# Patient Record
Sex: MALE
[De-identification: names, ages, dates, MRNs, and addresses within clinical notes are randomized per-mention and may not be internally consistent; named-entity substitution may affect disease eponyms.]

---

## 2019-10-24 PROBLEM — Z00.00 ENCOUNTER FOR PREVENTIVE HEALTH EXAMINATION: Status: ACTIVE | Noted: 2019-10-24

## 2019-10-29 ENCOUNTER — APPOINTMENT (OUTPATIENT)
Dept: OPHTHALMOLOGY | Facility: CLINIC | Age: 49
End: 2019-10-29
Payer: COMMERCIAL

## 2019-10-29 ENCOUNTER — NON-APPOINTMENT (OUTPATIENT)
Age: 49
End: 2019-10-29

## 2019-10-29 PROCEDURE — 92004 COMPRE OPH EXAM NEW PT 1/>: CPT

## 2023-12-21 ENCOUNTER — APPOINTMENT (OUTPATIENT)
Dept: UROLOGY | Facility: CLINIC | Age: 53
End: 2023-12-21
Payer: MEDICAID

## 2023-12-21 VITALS
TEMPERATURE: 97.5 F | OXYGEN SATURATION: 96 % | RESPIRATION RATE: 16 BRPM | WEIGHT: 200 LBS | DIASTOLIC BLOOD PRESSURE: 85 MMHG | SYSTOLIC BLOOD PRESSURE: 123 MMHG | BODY MASS INDEX: 32.14 KG/M2 | HEART RATE: 79 BPM | HEIGHT: 66 IN

## 2023-12-21 DIAGNOSIS — Z80.7 FAMILY HISTORY OF OTHER MALIGNANT NEOPLASMS OF LYMPHOID, HEMATOPOIETIC AND RELATED TISSUES: ICD-10-CM

## 2023-12-21 DIAGNOSIS — Z80.42 FAMILY HISTORY OF MALIGNANT NEOPLASM OF PROSTATE: ICD-10-CM

## 2023-12-21 DIAGNOSIS — Z78.9 OTHER SPECIFIED HEALTH STATUS: ICD-10-CM

## 2023-12-21 DIAGNOSIS — N52.9 MALE ERECTILE DYSFUNCTION, UNSPECIFIED: ICD-10-CM

## 2023-12-21 PROCEDURE — 99204 OFFICE O/P NEW MOD 45 MIN: CPT

## 2023-12-21 RX ORDER — MAGNESIUM OXIDE/MAG AA CHELATE 300 MG
CAPSULE ORAL
Refills: 0 | Status: ACTIVE | COMMUNITY

## 2023-12-21 RX ORDER — UBIDECARENONE/VIT E ACET 100MG-5
CAPSULE ORAL
Refills: 0 | Status: ACTIVE | COMMUNITY

## 2023-12-21 NOTE — HISTORY OF PRESENT ILLNESS
[FreeTextEntry1] : GERMAINE SHORT Oct  1 1970   Language: English Date of First visit: 12/21/2023 Accompanied by: self Contact info: Referring Provider/PCP: Dr. Avina Fax: [phone]   CC/ Problem List: ED =============================================================================== FIRST VISIT / Summary: Very pleasant 53 year old M here for ED. in new relationship and knows some is psychological, some issues in 30's and used low dose. Recently tried sildenafil and cialis max dose and had minimal effect. She is aware of issue.   ED: rare morning erections, less (sometimes after tadalafil he does). Rates erection <6/10 with partner even with medications. Sometimes with porn and medications is stronger.    ------------------------------------------------------------------------------------------- INTERVAL VISITS:   ===============================================================================   PMH: HLD Meds: statin, wellbutrin, lamocin, Acyclovir, Zyrtec All:  FHx: Father prostate cancer dx at 82 but did die after refusing chemo. No CAD or stroke SocHx:    PSH:    ROS: Review of Systems is as per HPI unless otherwise denoted below   =============================================================================== DATA: LABS (SELECTED):---------------------------------------------------------------------------------------------------     RADS:-------------------------------------------------------------------------------------------------------------------     PATHOLOGY/CYTOLOGY:-------------------------------------------------------------------------------------------     VOIDING STUDIES: ----------------------------------------------------------------------------------------------------     STONE STUDIES: (Analysis/LLSA)----------------------------------------------------------------------------------     PROCEDURES: -----------------------------------------------------------------------------------------------       =============================================================================== PHYSICAL EXAM:    FOCUSED: ----------------------------------------------------------------------------------------------------------------  stated no concern - declined   ======================================================================================= DISCUSSION: ======================================================================================= ASSESSMENT and PLAN   1. ED - likely psychogenic component - he is aware and actively managing - discussed medications and ICI - he will consider ICI for now has medications at home - Low T labs, return in am for draw     =======================================================================================  4 Thank you for allowing me to assist in the care of your patient. Should you have any questions please do not hesitate to reach out to me.     Gerardo Coronel MD                                                         [facility] Physician [facility]   [place] Office: [address], [address] [place] [address] T: [phone] F: [phone]   [place] Office: [address], [address] [place] [address] T: [phone] F: [phone]

## 2024-01-01 LAB
ESTIMATED AVERAGE GLUCOSE: 108 MG/DL
ESTRADIOL SERPL-MCNC: 16 PG/ML
FSH SERPL-MCNC: 3.3 IU/L
HBA1C MFR BLD HPLC: 5.4 %
LH SERPL-ACNC: 2.8 IU/L
PROLACTIN SERPL-MCNC: 14.3 NG/ML
SHBG SERPL-SCNC: 19.4 NMOL/L

## 2024-01-02 LAB
TESTOST FREE SERPL-MCNC: 9.5 PG/ML
TESTOST SERPL-MCNC: 205 NG/DL

## 2024-01-02 RX ORDER — PAPAVER/PHENTOLAMINE/ALPROSTAD 150-5-50
150-5-50 VIAL (EA) INTRACAVERNOSAL
Qty: 1 | Refills: 3 | Status: ACTIVE | COMMUNITY
Start: 2024-01-02 | End: 1900-01-01

## 2024-01-19 ENCOUNTER — APPOINTMENT (OUTPATIENT)
Dept: UROLOGY | Facility: CLINIC | Age: 54
End: 2024-01-19